# Patient Record
Sex: FEMALE | Race: BLACK OR AFRICAN AMERICAN | NOT HISPANIC OR LATINO | URBAN - METROPOLITAN AREA
[De-identification: names, ages, dates, MRNs, and addresses within clinical notes are randomized per-mention and may not be internally consistent; named-entity substitution may affect disease eponyms.]

---

## 2021-06-30 ENCOUNTER — LAB OUTSIDE AN ENCOUNTER (OUTPATIENT)
Dept: URBAN - METROPOLITAN AREA CLINIC 70 | Facility: CLINIC | Age: 46
End: 2021-06-30

## 2021-06-30 ENCOUNTER — DASHBOARD ENCOUNTERS (OUTPATIENT)
Age: 46
End: 2021-06-30

## 2021-06-30 ENCOUNTER — OFFICE VISIT (OUTPATIENT)
Dept: URBAN - METROPOLITAN AREA CLINIC 70 | Facility: CLINIC | Age: 46
End: 2021-06-30
Payer: COMMERCIAL

## 2021-06-30 ENCOUNTER — WEB ENCOUNTER (OUTPATIENT)
Dept: URBAN - METROPOLITAN AREA CLINIC 70 | Facility: CLINIC | Age: 46
End: 2021-06-30

## 2021-06-30 VITALS
TEMPERATURE: 96.8 F | DIASTOLIC BLOOD PRESSURE: 88 MMHG | WEIGHT: 115 LBS | SYSTOLIC BLOOD PRESSURE: 131 MMHG | BODY MASS INDEX: 20.38 KG/M2 | HEIGHT: 63 IN | HEART RATE: 103 BPM

## 2021-06-30 DIAGNOSIS — K92.0 HEMATEMESIS WITH NAUSEA: ICD-10-CM

## 2021-06-30 DIAGNOSIS — K92.1 HEMATOCHEZIA: ICD-10-CM

## 2021-06-30 DIAGNOSIS — F10.11 HISTORY OF ALCOHOL ABUSE: ICD-10-CM

## 2021-06-30 DIAGNOSIS — R93.3 ABNORMAL CT SCAN, STOMACH: ICD-10-CM

## 2021-06-30 PROCEDURE — 99204 OFFICE O/P NEW MOD 45 MIN: CPT | Performed by: INTERNAL MEDICINE

## 2021-06-30 RX ORDER — POLYETHYLENE GLYCOL 3350, SODIUM SULFATE ANHYDROUS, SODIUM BICARBONATE, SODIUM CHLORIDE, POTASSIUM CHLORIDE 236; 22.74; 6.74; 5.86; 2.97 G/4L; G/4L; G/4L; G/4L; G/4L
MIX AS DIRECTED FOR COLONOSCOPY PROCEDURE POWDER, FOR SOLUTION ORAL
Qty: 1 | Refills: 0 | OUTPATIENT
Start: 2021-06-30 | End: 2021-07-01

## 2021-06-30 RX ORDER — HYDROCORTISONE 10 MG/G
1 APPLICATION CREAM TOPICAL ONCE A DAY
Status: ACTIVE | COMMUNITY

## 2021-06-30 RX ORDER — PANTOPRAZOLE SODIUM 40 MG/1
1 TABLET TABLET, DELAYED RELEASE ORAL
Qty: 90 | Refills: 1 | OUTPATIENT
Start: 2021-06-30

## 2021-06-30 NOTE — HPI-TODAY'S VISIT:
Patient referred by Dr. Carmelo Macario due to abnormal CT A/P.  A copy of this note will be sent to the referring provider.  CT A/P w/o contrast on 5/20/21 showed thickening of gastric antrum concerning for gastroenteritis or underlying neoplasia and possible duodenal diverticulum.  No edema, free air, ascites, or signs of adenopathy were noted.  Currently voices constant daily pain to epigastric and RUQ regions that started over 3 months ago.  Pain is described as a hurting pain that is aggravated by eating in general, lying on right side, heavy lifting.  Denies any alleviating symptoms.  Voices daily nausea with occasional vomiting (with hematemesis), unexplained weight loss (described as tightening belt by 3 extra notches) and heartburn.  Denies use of OTC antacid.  Labs from 4/29/21:  WBC 4.6, Hgb 11.9, MCV 93.8, , , Vitamin D 12.  [place] denies history of alcoholism however records from [facility] show that patient has a history of alcoholism.  Last ethanol level 5/27/21 was at 39.  Labs from [facility] on 5/27/21:  WBC 4.5, Hgb 10.9, MCV 94.7,   Also voices BRRB daily that is observed on the tissue after wiping.  Denies straining with defecation.  Denies prior colonosocpy.

## 2021-06-30 NOTE — PHYSICAL EXAM GASTROINTESTINAL
Abdomen , soft, epigastric tender,ness, mildly distended, no guarding or rigidity , no masses palpable , normal bowel sounds , Liver and Spleen: no hepatosplenomegaly

## 2021-07-20 ENCOUNTER — OFFICE VISIT (OUTPATIENT)
Dept: URBAN - METROPOLITAN AREA SURGERY CENTER 17 | Facility: SURGERY CENTER | Age: 46
End: 2021-07-20

## 2021-08-16 PROBLEM — 371434005: Status: ACTIVE | Noted: 2021-06-30

## 2021-08-31 ENCOUNTER — OFFICE VISIT (OUTPATIENT)
Dept: URBAN - METROPOLITAN AREA SURGERY CENTER 17 | Facility: SURGERY CENTER | Age: 46
End: 2021-08-31

## 2021-10-21 ENCOUNTER — OFFICE VISIT (OUTPATIENT)
Dept: URBAN - METROPOLITAN AREA SURGERY CENTER 24 | Facility: SURGERY CENTER | Age: 46
End: 2021-10-21